# Patient Record
Sex: FEMALE
[De-identification: names, ages, dates, MRNs, and addresses within clinical notes are randomized per-mention and may not be internally consistent; named-entity substitution may affect disease eponyms.]

---

## 2021-01-29 PROBLEM — Z00.00 ENCOUNTER FOR PREVENTIVE HEALTH EXAMINATION: Status: ACTIVE | Noted: 2021-01-29

## 2021-02-04 ENCOUNTER — APPOINTMENT (OUTPATIENT)
Dept: NEUROLOGY | Facility: CLINIC | Age: 36
End: 2021-02-04
Payer: MEDICAID

## 2021-02-04 VITALS
HEIGHT: 63 IN | HEART RATE: 82 BPM | WEIGHT: 166 LBS | SYSTOLIC BLOOD PRESSURE: 105 MMHG | BODY MASS INDEX: 29.41 KG/M2 | DIASTOLIC BLOOD PRESSURE: 72 MMHG

## 2021-02-04 VITALS — TEMPERATURE: 97.3 F

## 2021-02-04 DIAGNOSIS — G43.009 MIGRAINE W/OUT AURA, NOT INTRACTABLE, W/OUT STATUS MIGRAINOSUS: ICD-10-CM

## 2021-02-04 DIAGNOSIS — Z82.0 FAMILY HISTORY OF EPILEPSY AND OTHER DISEASES OF THE NERVOUS SYSTEM: ICD-10-CM

## 2021-02-04 DIAGNOSIS — R51.9 HEADACHE, UNSPECIFIED: ICD-10-CM

## 2021-02-04 DIAGNOSIS — Z84.89 FAMILY HISTORY OF OTHER SPECIFIED CONDITIONS: ICD-10-CM

## 2021-02-04 PROCEDURE — 99204 OFFICE O/P NEW MOD 45 MIN: CPT

## 2021-02-04 PROCEDURE — 99072 ADDL SUPL MATRL&STAF TM PHE: CPT

## 2021-02-04 RX ORDER — PSYLLIUM HUSK 0.4 G
CAPSULE ORAL
Refills: 0 | Status: ACTIVE | COMMUNITY

## 2021-02-04 RX ORDER — PNV NO.95/FERROUS FUM/FOLIC AC 28MG-0.8MG
TABLET ORAL DAILY
Refills: 0 | Status: ACTIVE | COMMUNITY

## 2021-02-04 NOTE — HISTORY OF PRESENT ILLNESS
[FreeTextEntry1] : The patient is a nearly 36-year-old, right-handed, nonhypertensive, nondiabetic female, being seen for neurological evaluation of headaches.  She states that her headaches began approximately 3 or 3-1/2 years ago, coincident with the birth of her third child.  Her 2 older children are 15 and 10.  The younger children are 3-1/2 and 15 months old.  She describes these headaches as unilateral, but occurring on either side.  They are burning and intense in nature.  She is unable to carry on her usual activities during these headaches and she developed severe photophobia and phonophobia.  There is also some nausea involved.  The headaches are triggered by lack of sleep, lack of meals or anxiety or stress.  They are partially relieved by 1 g of Tylenol, but can last many hours to a full day.  There is no clear aura, but she feels a slight tingling pain right before the onset of the severe headache.  She retires to a dark room and cannot tolerate any sound.  She has not had any clear aura or any focal weakness.

## 2021-02-04 NOTE — PHYSICAL EXAM
[General Appearance - Alert] : alert [General Appearance - In No Acute Distress] : in no acute distress [Oriented To Time, Place, And Person] : oriented to person, place, and time [Impaired Insight] : insight and judgment were intact [Affect] : the affect was normal [Person] : oriented to person [Place] : oriented to place [Time] : oriented to time [Concentration Intact] : normal concentrating ability [Visual Intact] : visual attention was ~T not ~L decreased [Naming Objects] : no difficulty naming common objects [Repeating Phrases] : no difficulty repeating a phrase [Writing A Sentence] : no difficulty writing a sentence [Fluency] : fluency intact [Comprehension] : comprehension intact [Reading] : reading intact [Past History] : adequate knowledge of personal past history [Cranial Nerves Optic (II)] : visual acuity intact bilaterally,  visual fields full to confrontation, pupils equal round and reactive to light [Cranial Nerves Oculomotor (III)] : extraocular motion intact [Cranial Nerves Trigeminal (V)] : facial sensation intact symmetrically [Cranial Nerves Facial (VII)] : face symmetrical [Cranial Nerves Vestibulocochlear (VIII)] : hearing was intact bilaterally [Cranial Nerves Glossopharyngeal (IX)] : tongue and palate midline [Cranial Nerves Accessory (XI - Cranial And Spinal)] : head turning and shoulder shrug symmetric [Cranial Nerves Hypoglossal (XII)] : there was no tongue deviation with protrusion [Motor Strength] : muscle strength was normal in all four extremities [No Muscle Atrophy] : normal bulk in all four extremities [Sensation Tactile Decrease] : light touch was intact [Balance] : balance was intact [2+] : Ankle jerk left 2+ [Sclera] : the sclera and conjunctiva were normal [PERRL With Normal Accommodation] : pupils were equal in size, round, reactive to light, with normal accommodation [Extraocular Movements] : extraocular movements were intact [Outer Ear] : the ears and nose were normal in appearance [Oropharynx] : the oropharynx was normal [Neck Appearance] : the appearance of the neck was normal [Neck Cervical Mass (___cm)] : no neck mass was observed [Jugular Venous Distention Increased] : there was no jugular-venous distention [Thyroid Diffuse Enlargement] : the thyroid was not enlarged [Thyroid Nodule] : there were no palpable thyroid nodules [Auscultation Breath Sounds / Voice Sounds] : lungs were clear to auscultation bilaterally [Heart Rate And Rhythm] : heart rate was normal and rhythm regular [Heart Sounds] : normal S1 and S2 [Heart Sounds Gallop] : no gallops [Murmurs] : no murmurs [Heart Sounds Pericardial Friction Rub] : no pericardial rub [Full Pulse] : the pedal pulses are present [Edema] : there was no peripheral edema [No CVA Tenderness] : no ~M costovertebral angle tenderness [No Spinal Tenderness] : no spinal tenderness [Abnormal Walk] : normal gait [Nail Clubbing] : no clubbing  or cyanosis of the fingernails [Musculoskeletal - Swelling] : no joint swelling seen [Motor Tone] : muscle strength and tone were normal [Skin Color & Pigmentation] : normal skin color and pigmentation [Skin Turgor] : normal skin turgor [] : no rash [Past-pointing] : there was no past-pointing [Tremor] : no tremor present [Plantar Reflex Right Only] : normal on the right [Plantar Reflex Left Only] : normal on the left

## 2021-02-04 NOTE — ASSESSMENT
[FreeTextEntry1] : The patient is a nearly 36-year-old, right-handed, nonhypertensive, nondiabetic female, being seen for neurological evaluation of headaches.  She states that her headaches began approximately 3 or 3-1/2 years ago, coincident with the birth of her third child.  Her 2 older children are 15 and 10.  The younger children are 3-1/2 and 15 months old.  She describes these headaches as unilateral, but occurring on either side.  They are burning and intense in nature.  She is unable to carry on her usual activities during these headaches and she developed severe photophobia and phonophobia.  There is also some nausea involved.  The headaches are triggered by lack of sleep, lack of meals or anxiety or stress.  They are partially relieved by 1 g of Tylenol, but can last many hours to a full day.  There is no clear aura, but she feels a slight tingling pain right before the onset of the severe headache.  She retires to a dark room and cannot tolerate any sound.  She has not had any clear aura or any focal weakness.\par The general physical examination and the neurological examination are both entirely normal.  The history is quite clearly migrainous in nature.  She recognizes her triggers.  There is a family history.  The youngest child has some sort of sleep disorder which, in turn, interrupts the patient's sleep pattern and precipitates her migraines.  She is also stressed by the fact that she has 2 young children after having 2 teenagers.  Given the fact that she gets partial relief from Tylenol, I would suggest a strong nonsteroidal anti-inflammatory drug, such as Anaprox DS.  If this does not provide relief, she can be switched to a triptan.  I will provide her with a copy of this note to give to her internist so that she can continue with her treatment.  We have also discussed the role that triggers are playing in her life.  She has been instructed not to miss meals and to try to get adequate sleep (which may be impossible given her children).  I have spent a total of 55 minutes on this case.

## 2025-01-21 ENCOUNTER — NON-APPOINTMENT (OUTPATIENT)
Age: 40
End: 2025-01-21

## 2025-05-13 ENCOUNTER — APPOINTMENT (OUTPATIENT)
Dept: INTERNAL MEDICINE | Facility: CLINIC | Age: 40
End: 2025-05-13
Payer: MEDICAID

## 2025-05-13 VITALS
BODY MASS INDEX: 28.88 KG/M2 | DIASTOLIC BLOOD PRESSURE: 67 MMHG | SYSTOLIC BLOOD PRESSURE: 92 MMHG | HEART RATE: 89 BPM | WEIGHT: 163 LBS | OXYGEN SATURATION: 98 % | HEIGHT: 63 IN | TEMPERATURE: 97.9 F

## 2025-05-13 DIAGNOSIS — Z83.3 FAMILY HISTORY OF DIABETES MELLITUS: ICD-10-CM

## 2025-05-13 DIAGNOSIS — Z00.00 ENCOUNTER FOR GENERAL ADULT MEDICAL EXAMINATION W/OUT ABNORMAL FINDINGS: ICD-10-CM

## 2025-05-13 DIAGNOSIS — Z80.8 FAMILY HISTORY OF MALIGNANT NEOPLASM OF OTHER ORGANS OR SYSTEMS: ICD-10-CM

## 2025-05-13 DIAGNOSIS — Z11.3 ENCOUNTER FOR SCREENING FOR INFECTIONS WITH A PREDOMINANTLY SEXUAL MODE OF TRANSMISSION: ICD-10-CM

## 2025-05-13 DIAGNOSIS — R42 DIZZINESS AND GIDDINESS: ICD-10-CM

## 2025-05-13 DIAGNOSIS — R10.9 UNSPECIFIED ABDOMINAL PAIN: ICD-10-CM

## 2025-05-13 DIAGNOSIS — K21.9 GASTRO-ESOPHAGEAL REFLUX DISEASE W/OUT ESOPHAGITIS: ICD-10-CM

## 2025-05-13 DIAGNOSIS — E53.9 VITAMIN B DEFICIENCY, UNSPECIFIED: ICD-10-CM

## 2025-05-13 DIAGNOSIS — Z78.9 OTHER SPECIFIED HEALTH STATUS: ICD-10-CM

## 2025-05-13 PROCEDURE — 99385 PREV VISIT NEW AGE 18-39: CPT | Mod: 25

## 2025-05-13 RX ORDER — OMEPRAZOLE 40 MG/1
40 CAPSULE, DELAYED RELEASE ORAL
Qty: 30 | Refills: 0 | Status: ACTIVE | COMMUNITY
Start: 2025-05-13 | End: 1900-01-01

## 2025-05-13 RX ORDER — PNV NO.95/FERROUS FUM/FOLIC AC 28MG-0.8MG
TABLET ORAL
Refills: 0 | Status: ACTIVE | COMMUNITY

## 2025-05-13 RX ORDER — CHOLECALCIFEROL (VITAMIN D3) 50 MCG
50 MCG TABLET ORAL
Refills: 0 | Status: ACTIVE | COMMUNITY

## 2025-05-14 DIAGNOSIS — R79.89 OTHER SPECIFIED ABNORMAL FINDINGS OF BLOOD CHEMISTRY: ICD-10-CM

## 2025-05-14 LAB
25(OH)D3 SERPL-MCNC: 30.3 NG/ML
ALBUMIN SERPL ELPH-MCNC: 4.3 G/DL
ALP BLD-CCNC: 102 U/L
ALT SERPL-CCNC: 20 U/L
ANION GAP SERPL CALC-SCNC: 15 MMOL/L
APPEARANCE: CLEAR
AST SERPL-CCNC: 20 U/L
BACTERIA: ABNORMAL /HPF
BILIRUB SERPL-MCNC: 0.5 MG/DL
BILIRUBIN URINE: NEGATIVE
BLOOD URINE: NEGATIVE
BUN SERPL-MCNC: 13 MG/DL
C TRACH RRNA SPEC QL NAA+PROBE: NOT DETECTED
CALCIUM SERPL-MCNC: 9.8 MG/DL
CAST: 0 /LPF
CHLORIDE SERPL-SCNC: 103 MMOL/L
CHOLEST SERPL-MCNC: 216 MG/DL
CO2 SERPL-SCNC: 22 MMOL/L
COLOR: YELLOW
CREAT SERPL-MCNC: 0.77 MG/DL
EGFRCR SERPLBLD CKD-EPI 2021: 101 ML/MIN/1.73M2
EPITHELIAL CELLS: 9 /HPF
ESTIMATED AVERAGE GLUCOSE: 108 MG/DL
GLUCOSE QUALITATIVE U: NEGATIVE MG/DL
GLUCOSE SERPL-MCNC: 105 MG/DL
HBA1C MFR BLD HPLC: 5.4 %
HBV CORE IGG+IGM SER QL: NONREACTIVE
HBV SURFACE AB SER QL: NONREACTIVE
HBV SURFACE AG SER QL: NONREACTIVE
HCT VFR BLD CALC: 39 %
HCV AB SER QL: NONREACTIVE
HCV S/CO RATIO: 0.13 S/CO
HDLC SERPL-MCNC: 49 MG/DL
HGB BLD-MCNC: 12.6 G/DL
HIV1+2 AB SPEC QL IA.RAPID: NONREACTIVE
KETONES URINE: NEGATIVE MG/DL
LDLC SERPL-MCNC: 132 MG/DL
LEUKOCYTE ESTERASE URINE: NEGATIVE
MCHC RBC-ENTMCNC: 27.7 PG
MCHC RBC-ENTMCNC: 32.3 G/DL
MCV RBC AUTO: 85.7 FL
MICROSCOPIC-UA: NORMAL
N GONORRHOEA RRNA SPEC QL NAA+PROBE: NOT DETECTED
NITRITE URINE: NEGATIVE
NONHDLC SERPL-MCNC: 167 MG/DL
PH URINE: 6
PLATELET # BLD AUTO: 376 K/UL
POTASSIUM SERPL-SCNC: 4.2 MMOL/L
PROT SERPL-MCNC: 7.7 G/DL
PROTEIN URINE: NEGATIVE MG/DL
RBC # BLD: 4.55 M/UL
RBC # FLD: 14 %
RED BLOOD CELLS URINE: 3 /HPF
SODIUM SERPL-SCNC: 140 MMOL/L
SOURCE AMPLIFICATION: NORMAL
SPECIFIC GRAVITY URINE: 1.02
T PALLIDUM AB SER QL IA: NEGATIVE
TRIGL SERPL-MCNC: 199 MG/DL
TSH SERPL-ACNC: 1.08 UIU/ML
UROBILINOGEN URINE: 0.2 MG/DL
VIT B12 SERPL-MCNC: 380 PG/ML
WBC # FLD AUTO: 8.77 K/UL
WHITE BLOOD CELLS URINE: 0 /HPF

## 2025-05-14 RX ORDER — CHLORHEXIDINE GLUCONATE 4 %
1000 LIQUID (ML) TOPICAL DAILY
Qty: 30 | Refills: 6 | Status: ACTIVE | COMMUNITY
Start: 2025-05-14 | End: 1900-01-01

## 2025-05-15 LAB — BACTERIA UR CULT: NORMAL

## 2025-06-09 ENCOUNTER — RX RENEWAL (OUTPATIENT)
Age: 40
End: 2025-06-09

## 2025-08-13 ENCOUNTER — APPOINTMENT (OUTPATIENT)
Dept: INTERNAL MEDICINE | Facility: CLINIC | Age: 40
End: 2025-08-13

## 2025-08-22 ENCOUNTER — APPOINTMENT (OUTPATIENT)
Dept: INTERNAL MEDICINE | Facility: CLINIC | Age: 40
End: 2025-08-22
Payer: MEDICAID

## 2025-08-22 VITALS
TEMPERATURE: 98 F | DIASTOLIC BLOOD PRESSURE: 68 MMHG | HEIGHT: 63 IN | BODY MASS INDEX: 29.06 KG/M2 | WEIGHT: 164 LBS | OXYGEN SATURATION: 98 % | SYSTOLIC BLOOD PRESSURE: 96 MMHG | HEART RATE: 85 BPM

## 2025-08-22 DIAGNOSIS — K21.9 GASTRO-ESOPHAGEAL REFLUX DISEASE W/OUT ESOPHAGITIS: ICD-10-CM

## 2025-08-22 DIAGNOSIS — Z23 ENCOUNTER FOR IMMUNIZATION: ICD-10-CM

## 2025-08-22 DIAGNOSIS — E53.9 VITAMIN B DEFICIENCY, UNSPECIFIED: ICD-10-CM

## 2025-08-22 DIAGNOSIS — Z71.84 ENC FOR HEALTH COUNSELING RELATED TO TRAVEL: ICD-10-CM

## 2025-08-22 DIAGNOSIS — R79.89 OTHER SPECIFIED ABNORMAL FINDINGS OF BLOOD CHEMISTRY: ICD-10-CM

## 2025-08-22 DIAGNOSIS — G43.009 MIGRAINE W/OUT AURA, NOT INTRACTABLE, W/OUT STATUS MIGRAINOSUS: ICD-10-CM

## 2025-08-22 PROCEDURE — 90471 IMMUNIZATION ADMIN: CPT

## 2025-08-22 PROCEDURE — 90746 HEPB VACCINE 3 DOSE ADULT IM: CPT

## 2025-08-22 PROCEDURE — 90715 TDAP VACCINE 7 YRS/> IM: CPT

## 2025-08-22 PROCEDURE — G0010: CPT | Mod: 59

## 2025-08-22 PROCEDURE — 99214 OFFICE O/P EST MOD 30 MIN: CPT | Mod: 25

## 2025-09-17 ENCOUNTER — APPOINTMENT (OUTPATIENT)
Dept: OBGYN | Facility: CLINIC | Age: 40
End: 2025-09-17
Payer: MEDICAID

## 2025-09-17 PROCEDURE — 81002 URINALYSIS NONAUTO W/O SCOPE: CPT

## 2025-09-17 PROCEDURE — 99385 PREV VISIT NEW AGE 18-39: CPT | Mod: 25

## 2025-09-17 PROCEDURE — 99459 PELVIC EXAMINATION: CPT
